# Patient Record
Sex: FEMALE | Race: WHITE
[De-identification: names, ages, dates, MRNs, and addresses within clinical notes are randomized per-mention and may not be internally consistent; named-entity substitution may affect disease eponyms.]

---

## 2018-06-06 ENCOUNTER — HOSPITAL ENCOUNTER (EMERGENCY)
Dept: HOSPITAL 94 - ER | Age: 53
Discharge: HOME | End: 2018-06-06
Payer: COMMERCIAL

## 2018-06-06 VITALS — HEIGHT: 66 IN | BODY MASS INDEX: 25.6 KG/M2 | WEIGHT: 159.28 LBS

## 2018-06-06 VITALS — SYSTOLIC BLOOD PRESSURE: 132 MMHG | DIASTOLIC BLOOD PRESSURE: 94 MMHG

## 2018-06-06 DIAGNOSIS — Z88.5: ICD-10-CM

## 2018-06-06 DIAGNOSIS — R21: Primary | ICD-10-CM

## 2018-06-06 PROCEDURE — 99281 EMR DPT VST MAYX REQ PHY/QHP: CPT

## 2020-08-16 ENCOUNTER — HOSPITAL ENCOUNTER (EMERGENCY)
Dept: HOSPITAL 94 - ER | Age: 55
LOS: 1 days | Discharge: HOME | End: 2020-08-17
Payer: MEDICAID

## 2020-08-16 VITALS — BODY MASS INDEX: 24.16 KG/M2 | WEIGHT: 150.31 LBS | HEIGHT: 66 IN

## 2020-08-16 DIAGNOSIS — E86.0: ICD-10-CM
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
## 2020-08-16 DIAGNOSIS — R11.10: ICD-10-CM

## 2020-08-16 DIAGNOSIS — K29.20: ICD-10-CM

## 2020-08-16 DIAGNOSIS — Z88.5: ICD-10-CM

## 2020-08-16 DIAGNOSIS — Z79.899: ICD-10-CM

## 2020-08-16 DIAGNOSIS — F10.129: Primary | ICD-10-CM

## 2020-08-16 DIAGNOSIS — F10.239: ICD-10-CM

## 2020-08-16 DIAGNOSIS — H61.23: ICD-10-CM

## 2020-08-16 PROCEDURE — 96376 TX/PRO/DX INJ SAME DRUG ADON: CPT

## 2020-08-16 PROCEDURE — 93005 ELECTROCARDIOGRAM TRACING: CPT

## 2020-08-16 PROCEDURE — 83880 ASSAY OF NATRIURETIC PEPTIDE: CPT

## 2020-08-16 PROCEDURE — 80053 COMPREHEN METABOLIC PANEL: CPT

## 2020-08-16 PROCEDURE — 80320 DRUG SCREEN QUANTALCOHOLS: CPT

## 2020-08-16 PROCEDURE — 96375 TX/PRO/DX INJ NEW DRUG ADDON: CPT

## 2020-08-16 PROCEDURE — 36415 COLL VENOUS BLD VENIPUNCTURE: CPT

## 2020-08-16 PROCEDURE — 96365 THER/PROPH/DIAG IV INF INIT: CPT

## 2020-08-16 PROCEDURE — 96366 THER/PROPH/DIAG IV INF ADDON: CPT

## 2020-08-16 PROCEDURE — 84484 ASSAY OF TROPONIN QUANT: CPT

## 2020-08-16 PROCEDURE — 99284 EMERGENCY DEPT VISIT MOD MDM: CPT

## 2020-08-16 PROCEDURE — 83735 ASSAY OF MAGNESIUM: CPT

## 2020-08-16 PROCEDURE — 85025 COMPLETE CBC W/AUTO DIFF WBC: CPT

## 2020-08-17 VITALS — SYSTOLIC BLOOD PRESSURE: 140 MMHG | DIASTOLIC BLOOD PRESSURE: 85 MMHG

## 2020-08-17 LAB
ALBUMIN SERPL BCP-MCNC: 3.6 G/DL (ref 3.4–5)
ALBUMIN/GLOB SERPL: 1.1 {RATIO} (ref 1.1–1.5)
ALP SERPL-CCNC: 84 IU/L (ref 46–116)
ALT SERPL W P-5'-P-CCNC: 41 U/L (ref 12–78)
ANION GAP SERPL CALCULATED.3IONS-SCNC: 11 MMOL/L (ref 8–16)
AST SERPL W P-5'-P-CCNC: 50 U/L (ref 10–37)
BASOPHILS # BLD AUTO: 0.2 X10'3 (ref 0–0.2)
BASOPHILS NFR BLD AUTO: 2.7 % (ref 0–1)
BILIRUB SERPL-MCNC: 1.5 MG/DL (ref 0.1–1)
BUN SERPL-MCNC: 21 MG/DL (ref 7–18)
BUN/CREAT SERPL: 18.3 (ref 6.6–38)
CALCIUM SERPL-MCNC: 8.2 MG/DL (ref 8.5–10.1)
CHLORIDE SERPL-SCNC: 104 MMOL/L (ref 99–107)
CO2 SERPL-SCNC: 25.5 MMOL/L (ref 24–32)
CREAT SERPL-MCNC: 1.15 MG/DL (ref 0.4–0.9)
EOSINOPHIL # BLD AUTO: 0 X10'3 (ref 0–0.9)
EOSINOPHIL NFR BLD AUTO: 0.7 % (ref 0–6)
ERYTHROCYTE [DISTWIDTH] IN BLOOD BY AUTOMATED COUNT: 12.9 % (ref 11.5–14.5)
ETHANOL SERPL-MCNC: 0.34 GM/DL (ref 0–0.01)
GFR SERPL CREATININE-BSD FRML MDRD: 49 ML/MIN
GLUCOSE SERPL-MCNC: 122 MG/DL (ref 70–104)
HCT VFR BLD AUTO: 46.9 % (ref 35–45)
HGB BLD-MCNC: 16.2 G/DL (ref 12–16)
LYMPHOCYTES # BLD AUTO: 2.3 X10'3 (ref 1.1–4.8)
LYMPHOCYTES NFR BLD AUTO: 37.3 % (ref 21–51)
MAGNESIUM SERPL-MCNC: 1.9 MG/DL (ref 1.5–2.4)
MCH RBC QN AUTO: 32.3 PG (ref 27–31)
MCHC RBC AUTO-ENTMCNC: 34.6 G/DL (ref 33–36.5)
MCV RBC AUTO: 93.4 FL (ref 78–98)
MONOCYTES # BLD AUTO: 0.4 X10'3 (ref 0–0.9)
MONOCYTES NFR BLD AUTO: 6.6 % (ref 2–12)
NEUTROPHILS # BLD AUTO: 3.3 X10'3 (ref 1.8–7.7)
NEUTROPHILS NFR BLD AUTO: 52.7 % (ref 42–75)
PLATELET # BLD AUTO: 286 X10'3 (ref 140–440)
PMV BLD AUTO: 8.4 FL (ref 7.4–10.4)
POTASSIUM SERPL-SCNC: 3.4 MMOL/L (ref 3.5–5.1)
PROT SERPL-MCNC: 7 G/DL (ref 6.4–8.2)
RBC # BLD AUTO: 5.02 X10'6 (ref 4.2–5.6)
SODIUM SERPL-SCNC: 140 MMOL/L (ref 135–145)
WBC # BLD AUTO: 6.3 X10'3 (ref 4.5–11)

## 2020-08-17 NOTE — NUR
GAVE PT SOME CRACKERS , DAYSI ASENCIO. DR GUERA GARCIA AS WELL AS PRIMARY RN . 
PT ALSO GIVEN THREE WARM BLANKETS PLAN OF CARE UPDATED

## 2021-01-07 ENCOUNTER — HOSPITAL ENCOUNTER (EMERGENCY)
Dept: HOSPITAL 94 - ER | Age: 56
LOS: 1 days | Discharge: HOME | End: 2021-01-08
Payer: MEDICAID

## 2021-01-07 VITALS — WEIGHT: 160.34 LBS | HEIGHT: 66 IN | BODY MASS INDEX: 25.77 KG/M2

## 2021-01-07 DIAGNOSIS — Z79.899: ICD-10-CM

## 2021-01-07 DIAGNOSIS — Z60.2: ICD-10-CM

## 2021-01-07 DIAGNOSIS — Z79.2: ICD-10-CM

## 2021-01-07 DIAGNOSIS — E87.6: ICD-10-CM

## 2021-01-07 DIAGNOSIS — Z72.89: ICD-10-CM

## 2021-01-07 DIAGNOSIS — N39.0: Primary | ICD-10-CM

## 2021-01-07 DIAGNOSIS — E87.2: ICD-10-CM

## 2021-01-07 DIAGNOSIS — F10.129: ICD-10-CM

## 2021-01-07 DIAGNOSIS — Z88.8: ICD-10-CM
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
## 2021-01-07 LAB
ALBUMIN SERPL BCP-MCNC: 4 G/DL (ref 3.4–5)
ALBUMIN/GLOB SERPL: 1.1 {RATIO} (ref 1.1–1.5)
ALP SERPL-CCNC: 77 IU/L (ref 46–116)
ALT SERPL W P-5'-P-CCNC: 117 U/L (ref 12–78)
ANION GAP SERPL CALCULATED.3IONS-SCNC: 19 MMOL/L (ref 8–16)
AST SERPL W P-5'-P-CCNC: 201 U/L (ref 10–37)
BACTERIA URNS QL MICRO: (no result) /HPF
BASOPHILS # BLD AUTO: 0.1 X10'3 (ref 0–0.2)
BASOPHILS NFR BLD AUTO: 1.1 % (ref 0–1)
BILIRUB SERPL-MCNC: 0.5 MG/DL (ref 0.1–1)
BUN SERPL-MCNC: 19 MG/DL (ref 7–18)
BUN/CREAT SERPL: 20.2 (ref 6.6–38)
CALCIUM SERPL-MCNC: 8.4 MG/DL (ref 8.5–10.1)
CHLORIDE SERPL-SCNC: 102 MMOL/L (ref 99–107)
CLARITY UR: (no result)
CO2 SERPL-SCNC: 23.1 MMOL/L (ref 24–32)
COLOR UR: YELLOW
CREAT SERPL-MCNC: 0.94 MG/DL (ref 0.4–0.9)
DEPRECATED SQUAMOUS URNS QL MICRO: (no result) /LPF
EOSINOPHIL # BLD AUTO: 0 X10'3 (ref 0–0.9)
EOSINOPHIL NFR BLD AUTO: 0 % (ref 0–6)
ERYTHROCYTE [DISTWIDTH] IN BLOOD BY AUTOMATED COUNT: 14.3 % (ref 11.5–14.5)
ETHANOL SERPL-MCNC: 0.4 GM/DL (ref 0–0.01)
GFR SERPL CREATININE-BSD FRML MDRD: 62 ML/MIN
GLUCOSE SERPL-MCNC: 100 MG/DL (ref 70–104)
GLUCOSE UR STRIP-MCNC: NEGATIVE MG/DL
HCT VFR BLD AUTO: 44.2 % (ref 35–45)
HGB BLD-MCNC: 15.1 G/DL (ref 12–16)
HGB UR QL STRIP: (no result)
KETONES UR STRIP-MCNC: NEGATIVE MG/DL
LEUKOCYTE ESTERASE UR QL STRIP: (no result)
LYMPHOCYTES # BLD AUTO: 1.8 X10'3 (ref 1.1–4.8)
LYMPHOCYTES NFR BLD AUTO: 16.5 % (ref 21–51)
MCH RBC QN AUTO: 32.7 PG (ref 27–31)
MCHC RBC AUTO-ENTMCNC: 34.2 G/DL (ref 33–36.5)
MCV RBC AUTO: 95.4 FL (ref 78–98)
MONOCYTES # BLD AUTO: 0.3 X10'3 (ref 0–0.9)
MONOCYTES NFR BLD AUTO: 2.9 % (ref 2–12)
NEUTROPHILS # BLD AUTO: 8.5 X10'3 (ref 1.8–7.7)
NEUTROPHILS NFR BLD AUTO: 79.5 % (ref 42–75)
NITRITE UR QL STRIP: POSITIVE
PH UR STRIP: 6 [PH] (ref 4.8–8)
PLATELET # BLD AUTO: 253 X10'3 (ref 140–440)
PMV BLD AUTO: 8 FL (ref 7.4–10.4)
POTASSIUM SERPL-SCNC: 3.4 MMOL/L (ref 3.5–5.1)
PROT SERPL-MCNC: 7.5 G/DL (ref 6.4–8.2)
PROT UR QL STRIP: NEGATIVE MG/DL
RBC # BLD AUTO: 4.63 X10'6 (ref 4.2–5.6)
RBC #/AREA URNS HPF: (no result) /HPF (ref 0–2)
SODIUM SERPL-SCNC: 144 MMOL/L (ref 135–145)
SP GR UR STRIP: <=1.005 (ref 1–1.03)
URN COLLECT METHOD CLASS: (no result)
UROBILINOGEN UR STRIP-MCNC: 0.2 E.U/DL (ref 0.2–1)
WBC # BLD AUTO: 10.7 X10'3 (ref 4.5–11)

## 2021-01-07 PROCEDURE — 96361 HYDRATE IV INFUSION ADD-ON: CPT

## 2021-01-07 PROCEDURE — 96365 THER/PROPH/DIAG IV INF INIT: CPT

## 2021-01-07 PROCEDURE — 87088 URINE BACTERIA CULTURE: CPT

## 2021-01-07 PROCEDURE — 83605 ASSAY OF LACTIC ACID: CPT

## 2021-01-07 PROCEDURE — 96376 TX/PRO/DX INJ SAME DRUG ADON: CPT

## 2021-01-07 PROCEDURE — 87186 SC STD MICRODIL/AGAR DIL: CPT

## 2021-01-07 PROCEDURE — 87077 CULTURE AEROBIC IDENTIFY: CPT

## 2021-01-07 PROCEDURE — 93005 ELECTROCARDIOGRAM TRACING: CPT

## 2021-01-07 PROCEDURE — 96366 THER/PROPH/DIAG IV INF ADDON: CPT

## 2021-01-07 PROCEDURE — 36415 COLL VENOUS BLD VENIPUNCTURE: CPT

## 2021-01-07 PROCEDURE — 85025 COMPLETE CBC W/AUTO DIFF WBC: CPT

## 2021-01-07 PROCEDURE — 80053 COMPREHEN METABOLIC PANEL: CPT

## 2021-01-07 PROCEDURE — 96375 TX/PRO/DX INJ NEW DRUG ADDON: CPT

## 2021-01-07 PROCEDURE — 96367 TX/PROPH/DG ADDL SEQ IV INF: CPT

## 2021-01-07 PROCEDURE — 80320 DRUG SCREEN QUANTALCOHOLS: CPT

## 2021-01-07 PROCEDURE — 81001 URINALYSIS AUTO W/SCOPE: CPT

## 2021-01-07 PROCEDURE — 99285 EMERGENCY DEPT VISIT HI MDM: CPT

## 2021-01-07 SDOH — SOCIAL STABILITY - SOCIAL INSECURITY: PROBLEMS RELATED TO LIVING ALONE: Z60.2

## 2021-01-08 VITALS — SYSTOLIC BLOOD PRESSURE: 121 MMHG | DIASTOLIC BLOOD PRESSURE: 94 MMHG

## 2021-01-08 LAB
ALBUMIN SERPL BCP-MCNC: 3 G/DL (ref 3.4–5)
ALBUMIN/GLOB SERPL: 1.1 {RATIO} (ref 1.1–1.5)
ALP SERPL-CCNC: 61 IU/L (ref 46–116)
ALT SERPL W P-5'-P-CCNC: 86 U/L (ref 12–78)
ANION GAP SERPL CALCULATED.3IONS-SCNC: 14 MMOL/L (ref 8–16)
AST SERPL W P-5'-P-CCNC: 126 U/L (ref 10–37)
BILIRUB SERPL-MCNC: 0.5 MG/DL (ref 0.1–1)
BUN SERPL-MCNC: 18 MG/DL (ref 7–18)
BUN/CREAT SERPL: 20 (ref 6.6–38)
CALCIUM SERPL-MCNC: 6.9 MG/DL (ref 8.5–10.1)
CHLORIDE SERPL-SCNC: 106 MMOL/L (ref 99–107)
CO2 SERPL-SCNC: 22.1 MMOL/L (ref 24–32)
CREAT SERPL-MCNC: 0.9 MG/DL (ref 0.4–0.9)
GFR SERPL CREATININE-BSD FRML MDRD: 65 ML/MIN
GLUCOSE SERPL-MCNC: 149 MG/DL (ref 70–104)
POTASSIUM SERPL-SCNC: 3.6 MMOL/L (ref 3.5–5.1)
PROT SERPL-MCNC: 5.8 G/DL (ref 6.4–8.2)
SODIUM SERPL-SCNC: 142 MMOL/L (ref 135–145)

## 2021-07-24 ENCOUNTER — HOSPITAL ENCOUNTER (EMERGENCY)
Dept: HOSPITAL 94 - ER | Age: 56
Discharge: HOME | End: 2021-07-24
Payer: MEDICAID

## 2021-07-24 VITALS — BODY MASS INDEX: 25.28 KG/M2 | HEIGHT: 66 IN | WEIGHT: 157.32 LBS

## 2021-07-24 VITALS — SYSTOLIC BLOOD PRESSURE: 157 MMHG | DIASTOLIC BLOOD PRESSURE: 104 MMHG

## 2021-07-24 DIAGNOSIS — S00.31XA: ICD-10-CM

## 2021-07-24 DIAGNOSIS — F32.9: ICD-10-CM
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
## 2021-07-24 DIAGNOSIS — G89.29: ICD-10-CM

## 2021-07-24 DIAGNOSIS — Z79.899: ICD-10-CM

## 2021-07-24 DIAGNOSIS — Z02.89: Primary | ICD-10-CM
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
## 2021-07-24 DIAGNOSIS — F10.20: ICD-10-CM

## 2021-07-24 DIAGNOSIS — Z72.89: ICD-10-CM

## 2021-07-24 DIAGNOSIS — Z88.8: ICD-10-CM

## 2021-07-24 DIAGNOSIS — Z60.2: ICD-10-CM
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
## 2021-07-24 PROCEDURE — 99284 EMERGENCY DEPT VISIT MOD MDM: CPT

## 2021-07-24 PROCEDURE — 70450 CT HEAD/BRAIN W/O DYE: CPT

## 2021-07-24 SDOH — SOCIAL STABILITY - SOCIAL INSECURITY: PROBLEMS RELATED TO LIVING ALONE: Z60.2

## 2021-09-12 ENCOUNTER — HOSPITAL ENCOUNTER (INPATIENT)
Dept: HOSPITAL 94 - ER | Age: 56
LOS: 2 days | Discharge: HOME | DRG: 45 | End: 2021-09-14
Attending: INTERNAL MEDICINE | Admitting: INTERNAL MEDICINE
Payer: MEDICAID

## 2021-09-12 VITALS — HEIGHT: 66 IN | WEIGHT: 141.1 LBS | BODY MASS INDEX: 22.68 KG/M2

## 2021-09-12 DIAGNOSIS — R26.81: ICD-10-CM

## 2021-09-12 DIAGNOSIS — N18.30: ICD-10-CM

## 2021-09-12 DIAGNOSIS — Z60.2: ICD-10-CM

## 2021-09-12 DIAGNOSIS — R74.8: ICD-10-CM

## 2021-09-12 DIAGNOSIS — F10.229: ICD-10-CM

## 2021-09-12 DIAGNOSIS — E87.1: ICD-10-CM

## 2021-09-12 DIAGNOSIS — Z88.5: ICD-10-CM

## 2021-09-12 DIAGNOSIS — Z79.890: ICD-10-CM

## 2021-09-12 DIAGNOSIS — K72.90: ICD-10-CM

## 2021-09-12 DIAGNOSIS — Z79.82: ICD-10-CM

## 2021-09-12 DIAGNOSIS — R29.702: ICD-10-CM

## 2021-09-12 DIAGNOSIS — R21: ICD-10-CM

## 2021-09-12 DIAGNOSIS — E87.6: ICD-10-CM

## 2021-09-12 DIAGNOSIS — Z79.899: ICD-10-CM

## 2021-09-12 DIAGNOSIS — B96.89: ICD-10-CM

## 2021-09-12 DIAGNOSIS — I63.9: Primary | ICD-10-CM

## 2021-09-12 DIAGNOSIS — R47.1: ICD-10-CM

## 2021-09-12 DIAGNOSIS — D69.59: ICD-10-CM

## 2021-09-12 DIAGNOSIS — F32.9: ICD-10-CM

## 2021-09-12 DIAGNOSIS — G89.29: ICD-10-CM

## 2021-09-12 DIAGNOSIS — Z71.41: ICD-10-CM

## 2021-09-12 DIAGNOSIS — R26.89: ICD-10-CM

## 2021-09-12 DIAGNOSIS — E03.9: ICD-10-CM

## 2021-09-12 DIAGNOSIS — Z79.02: ICD-10-CM

## 2021-09-12 DIAGNOSIS — N39.0: ICD-10-CM

## 2021-09-12 DIAGNOSIS — I12.9: ICD-10-CM

## 2021-09-12 LAB
ALBUMIN SERPL BCP-MCNC: 3 G/DL (ref 3.4–5)
ALBUMIN SERPL BCP-MCNC: 3.5 G/DL (ref 3.4–5)
ALBUMIN/GLOB SERPL: 0.9 {RATIO} (ref 1.1–1.5)
ALP SERPL-CCNC: 127 IU/L (ref 46–116)
ALT SERPL W P-5'-P-CCNC: 108 U/L (ref 12–78)
AMPHETAMINES UR QL SCN: NEGATIVE
ANION GAP SERPL CALCULATED.3IONS-SCNC: 13 MMOL/L (ref 8–16)
ANION GAP SERPL CALCULATED.3IONS-SCNC: 21 MMOL/L (ref 8–16)
ANISOCYTOSIS BLD QL SMEAR: (no result)
AST SERPL W P-5'-P-CCNC: 232 U/L (ref 10–37)
BACTERIA URNS QL MICRO: (no result) /HPF
BARBITURATES UR QL SCN: NEGATIVE
BASOPHILS # BLD AUTO: 0 X10'3 (ref 0–0.2)
BASOPHILS NFR BLD AUTO: 0.2 % (ref 0–1)
BENZODIAZ UR QL SCN: NEGATIVE
BILIRUB SERPL-MCNC: 1.6 MG/DL (ref 0.1–1)
BUN SERPL-MCNC: 30 MG/DL (ref 7–18)
BUN SERPL-MCNC: 35 MG/DL (ref 7–18)
BUN/CREAT SERPL: 20.6 (ref 6.6–38)
BUN/CREAT SERPL: 23.4 (ref 6.6–38)
BZE UR QL SCN: NEGATIVE
CALCIUM SERPL-MCNC: 7 MG/DL (ref 8.5–10.1)
CALCIUM SERPL-MCNC: 7.6 MG/DL (ref 8.5–10.1)
CANNABINOIDS UR QL SCN: NEGATIVE
CHLORIDE SERPL-SCNC: 80 MMOL/L (ref 99–107)
CHLORIDE SERPL-SCNC: 92 MMOL/L (ref 99–107)
CLARITY UR: (no result)
CO2 SERPL-SCNC: 21.2 MMOL/L (ref 24–32)
CO2 SERPL-SCNC: 25.7 MMOL/L (ref 24–32)
COLOR UR: YELLOW
CREAT SERPL-MCNC: 1.28 MG/DL (ref 0.4–0.9)
CREAT SERPL-MCNC: 1.7 MG/DL (ref 0.4–0.9)
DEPRECATED SQUAMOUS URNS QL MICRO: (no result) /LPF
EOSINOPHIL # BLD AUTO: 0 X10'3 (ref 0–0.9)
EOSINOPHIL NFR BLD AUTO: 0.3 % (ref 0–6)
ERYTHROCYTE [DISTWIDTH] IN BLOOD BY AUTOMATED COUNT: 15.8 % (ref 11.5–14.5)
ETHANOL SERPL-MCNC: < 0.01 GM/DL (ref 0–0.01)
GFR SERPL CREATININE-BSD FRML MDRD: 31 ML/MIN
GFR SERPL CREATININE-BSD FRML MDRD: 43 ML/MIN
GLUCOSE SERPL-MCNC: 134 MG/DL (ref 70–104)
GLUCOSE SERPL-MCNC: 197 MG/DL (ref 70–104)
GLUCOSE UR STRIP-MCNC: NEGATIVE MG/DL
HBA1C MFR BLD: 6 % (ref 4.5–6.2)
HCT VFR BLD AUTO: 42.9 % (ref 35–45)
HGB BLD-MCNC: 14.3 G/DL (ref 12–16)
HGB UR QL STRIP: (no result)
KETONES UR STRIP-MCNC: 15 MG/DL
LEUKOCYTE ESTERASE UR QL STRIP: (no result)
LYMPHOCYTES # BLD AUTO: 0.3 X10'3 (ref 1.1–4.8)
LYMPHOCYTES NFR BLD AUTO: 4.6 % (ref 21–51)
LYMPHOCYTES NFR BLD MANUAL: 3 % (ref 21–51)
MACROCYTES BLD QL SMEAR: (no result)
MCH RBC QN AUTO: 32.5 PG (ref 27–31)
MCHC RBC AUTO-ENTMCNC: 33.3 G/DL (ref 33–36.5)
MCV RBC AUTO: 97.5 FL (ref 78–98)
METHADONE UR QL SCN: NEGATIVE
MONOCYTES # BLD AUTO: 0.3 X10'3 (ref 0–0.9)
MONOCYTES NFR BLD AUTO: 4 % (ref 2–12)
MONOCYTES NFR BLD MANUAL: 3 % (ref 2–12)
NEUTROPHILS # BLD AUTO: 6 X10'3 (ref 1.8–7.7)
NEUTROPHILS NFR BLD AUTO: 90.9 % (ref 42–75)
NEUTS BAND # BLD MANUAL: 32 % (ref 0–10)
NEUTS SEG NFR BLD MANUAL: 62 % (ref 42–75)
NEUTS VAC BLD QL SMEAR: (no result)
NITRITE UR QL STRIP: NEGATIVE
NRBC BLD MANUAL-RTO: 1 /100WBC (ref 0–0)
OPIATES UR QL SCN: NEGATIVE
PCP UR QL SCN: NEGATIVE
PH UR STRIP: 6.5 [PH] (ref 4.8–8)
PLATELET # BLD AUTO: 101 X10'3 (ref 140–440)
PLATELET BLD QL SMEAR: (no result)
PMV BLD AUTO: 9.2 FL (ref 7.4–10.4)
POLYCHROMASIA BLD QL SMEAR: (no result)
POTASSIUM SERPL-SCNC: 3.7 MMOL/L (ref 3.5–5.1)
POTASSIUM SERPL-SCNC: 3.9 MMOL/L (ref 3.5–5.1)
PROT SERPL-MCNC: 7.2 G/DL (ref 6.4–8.2)
PROT UR QL STRIP: NEGATIVE MG/DL
RBC # BLD AUTO: 4.4 X10'6 (ref 4.2–5.6)
RBC #/AREA URNS HPF: (no result) /HPF (ref 0–2)
RBC MORPH BLD: (no result)
SODIUM SERPL-SCNC: 122 MMOL/L (ref 135–145)
SODIUM SERPL-SCNC: 131 MMOL/L (ref 135–145)
SP GR UR STRIP: 1 (ref 1–1.03)
TOTAL CELLS COUNTED FLD: 100
URN COLLECT METHOD CLASS: (no result)
UROBILINOGEN UR STRIP-MCNC: 0.2 E.U/DL (ref 0.2–1)
WBC # BLD AUTO: 6.6 X10'3 (ref 4.5–11)
WBC #/AREA URNS HPF: (no result) /HPF (ref 0–4)
WBC CLUMPS #/AREA URNS HPF: (no result) /HPF

## 2021-09-12 PROCEDURE — 70450 CT HEAD/BRAIN W/O DYE: CPT

## 2021-09-12 PROCEDURE — 87186 SC STD MICRODIL/AGAR DIL: CPT

## 2021-09-12 PROCEDURE — 70551 MRI BRAIN STEM W/O DYE: CPT

## 2021-09-12 PROCEDURE — 93880 EXTRACRANIAL BILAT STUDY: CPT

## 2021-09-12 PROCEDURE — 80305 DRUG TEST PRSMV DIR OPT OBS: CPT

## 2021-09-12 PROCEDURE — 83036 HEMOGLOBIN GLYCOSYLATED A1C: CPT

## 2021-09-12 PROCEDURE — 97530 THERAPEUTIC ACTIVITIES: CPT

## 2021-09-12 PROCEDURE — 80320 DRUG SCREEN QUANTALCOHOLS: CPT

## 2021-09-12 PROCEDURE — 99285 EMERGENCY DEPT VISIT HI MDM: CPT

## 2021-09-12 PROCEDURE — 80061 LIPID PANEL: CPT

## 2021-09-12 PROCEDURE — 83735 ASSAY OF MAGNESIUM: CPT

## 2021-09-12 PROCEDURE — 82140 ASSAY OF AMMONIA: CPT

## 2021-09-12 PROCEDURE — 85007 BL SMEAR W/DIFF WBC COUNT: CPT

## 2021-09-12 PROCEDURE — 80048 BASIC METABOLIC PNL TOTAL CA: CPT

## 2021-09-12 PROCEDURE — 93005 ELECTROCARDIOGRAM TRACING: CPT

## 2021-09-12 PROCEDURE — 92616 FEES W/LARYNGEAL SENSE TEST: CPT

## 2021-09-12 PROCEDURE — 97161 PT EVAL LOW COMPLEX 20 MIN: CPT

## 2021-09-12 PROCEDURE — 87088 URINE BACTERIA CULTURE: CPT

## 2021-09-12 PROCEDURE — 36415 COLL VENOUS BLD VENIPUNCTURE: CPT

## 2021-09-12 PROCEDURE — 87081 CULTURE SCREEN ONLY: CPT

## 2021-09-12 PROCEDURE — 92508 TX SP LANG VOICE COMM GROUP: CPT

## 2021-09-12 PROCEDURE — 87077 CULTURE AEROBIC IDENTIFY: CPT

## 2021-09-12 PROCEDURE — 83690 ASSAY OF LIPASE: CPT

## 2021-09-12 PROCEDURE — 93306 TTE W/DOPPLER COMPLETE: CPT

## 2021-09-12 PROCEDURE — 84100 ASSAY OF PHOSPHORUS: CPT

## 2021-09-12 PROCEDURE — 85025 COMPLETE CBC W/AUTO DIFF WBC: CPT

## 2021-09-12 PROCEDURE — 80053 COMPREHEN METABOLIC PANEL: CPT

## 2021-09-12 PROCEDURE — 71045 X-RAY EXAM CHEST 1 VIEW: CPT

## 2021-09-12 PROCEDURE — 82948 REAGENT STRIP/BLOOD GLUCOSE: CPT

## 2021-09-12 PROCEDURE — 70544 MR ANGIOGRAPHY HEAD W/O DYE: CPT

## 2021-09-12 PROCEDURE — 97116 GAIT TRAINING THERAPY: CPT

## 2021-09-12 PROCEDURE — 81001 URINALYSIS AUTO W/SCOPE: CPT

## 2021-09-12 SDOH — SOCIAL STABILITY - SOCIAL INSECURITY: PROBLEMS RELATED TO LIVING ALONE: Z60.2

## 2021-09-13 VITALS — DIASTOLIC BLOOD PRESSURE: 80 MMHG | SYSTOLIC BLOOD PRESSURE: 123 MMHG

## 2021-09-13 VITALS — DIASTOLIC BLOOD PRESSURE: 78 MMHG | SYSTOLIC BLOOD PRESSURE: 126 MMHG

## 2021-09-13 LAB
ALBUMIN SERPL BCP-MCNC: 3 G/DL (ref 3.4–5)
ALBUMIN/GLOB SERPL: 0.9 {RATIO} (ref 1.1–1.5)
ALP SERPL-CCNC: 108 IU/L (ref 46–116)
ALT SERPL W P-5'-P-CCNC: 94 U/L (ref 12–78)
ANION GAP SERPL CALCULATED.3IONS-SCNC: 16 MMOL/L (ref 8–16)
AST SERPL W P-5'-P-CCNC: 186 U/L (ref 10–37)
BASOPHILS # BLD AUTO: 0 X10'3 (ref 0–0.2)
BASOPHILS NFR BLD AUTO: 0.1 % (ref 0–1)
BILIRUB SERPL-MCNC: 1.1 MG/DL (ref 0.1–1)
BUN SERPL-MCNC: 23 MG/DL (ref 7–18)
BUN/CREAT SERPL: 18.4 (ref 6.6–38)
CALCIUM SERPL-MCNC: 7.3 MG/DL (ref 8.5–10.1)
CHLORIDE SERPL-SCNC: 94 MMOL/L (ref 99–107)
CHOLEST SERPL-MCNC: 115 MG/DL (ref 0–200)
CHOLEST/HDLC SERPL: 3.7 {RATIO} (ref 0–4.99)
CO2 SERPL-SCNC: 24.5 MMOL/L (ref 24–32)
CREAT SERPL-MCNC: 1.25 MG/DL (ref 0.4–0.9)
EOSINOPHIL # BLD AUTO: 0 X10'3 (ref 0–0.9)
EOSINOPHIL NFR BLD AUTO: 0.1 % (ref 0–6)
ERYTHROCYTE [DISTWIDTH] IN BLOOD BY AUTOMATED COUNT: 14.8 % (ref 11.5–14.5)
GFR SERPL CREATININE-BSD FRML MDRD: 44 ML/MIN
GLUCOSE SERPL-MCNC: 132 MG/DL (ref 70–104)
HCT VFR BLD AUTO: 38.2 % (ref 35–45)
HDLC SERPL-MCNC: 31 MG/DL (ref 35–60)
HGB BLD-MCNC: 13 G/DL (ref 12–16)
LDLC SERPL DIRECT ASSAY-MCNC: 39 MG/DL (ref 50–100)
LIPASE SERPL-CCNC: 857 U/L (ref 73–393)
LYMPHOCYTES # BLD AUTO: 0.3 X10'3 (ref 1.1–4.8)
LYMPHOCYTES NFR BLD AUTO: 5 % (ref 21–51)
MAGNESIUM SERPL-MCNC: 1.9 MG/DL (ref 1.5–2.4)
MCH RBC QN AUTO: 31.9 PG (ref 27–31)
MCHC RBC AUTO-ENTMCNC: 34 G/DL (ref 33–36.5)
MCV RBC AUTO: 93.9 FL (ref 78–98)
MONOCYTES # BLD AUTO: 0.4 X10'3 (ref 0–0.9)
MONOCYTES NFR BLD AUTO: 6.7 % (ref 2–12)
NEUTROPHILS # BLD AUTO: 5.6 X10'3 (ref 1.8–7.7)
NEUTROPHILS NFR BLD AUTO: 88.1 % (ref 42–75)
PHOSPHATE SERPL-MCNC: 1.8 MG/DL (ref 2.3–4.5)
PLATELET # BLD AUTO: 83 X10'3 (ref 140–440)
PMV BLD AUTO: 9.1 FL (ref 7.4–10.4)
POTASSIUM SERPL-SCNC: 3.4 MMOL/L (ref 3.5–5.1)
PROT SERPL-MCNC: 6.3 G/DL (ref 6.4–8.2)
RBC # BLD AUTO: 4.07 X10'6 (ref 4.2–5.6)
SODIUM SERPL-SCNC: 134 MMOL/L (ref 135–145)
TRIGL SERPL-MCNC: 230 MG/DL (ref 20–135)
WBC # BLD AUTO: 6.3 X10'3 (ref 4.5–11)

## 2021-09-13 RX ADMIN — NYSTATIN AND TRIAMCINOLONE ACETONIDE SCH APPLIC: 100000; 1 CREAM TOPICAL at 22:20

## 2021-09-13 RX ADMIN — Medication SCH TAB: at 16:26

## 2021-09-13 RX ADMIN — SODIUM CHLORIDE SCH MLS/HR: 9 INJECTION INTRAMUSCULAR; INTRAVENOUS; SUBCUTANEOUS at 00:06

## 2021-09-13 RX ADMIN — CEFTRIAXONE SCH MLS/HR: 2 INJECTION, SOLUTION INTRAVENOUS at 00:06

## 2021-09-13 RX ADMIN — CEFTRIAXONE SCH MLS/HR: 2 INJECTION, SOLUTION INTRAVENOUS at 10:12

## 2021-09-13 RX ADMIN — LEVOTHYROXINE SODIUM SCH MCG: 75 TABLET ORAL at 10:12

## 2021-09-13 RX ADMIN — NYSTATIN AND TRIAMCINOLONE ACETONIDE SCH APPLIC: 100000; 1 CREAM TOPICAL at 10:13

## 2021-09-13 RX ADMIN — BUPROPION HYDROCHLORIDE SCH MG: 150 TABLET, FILM COATED, EXTENDED RELEASE ORAL at 20:00

## 2021-09-13 RX ADMIN — BUPROPION HYDROCHLORIDE SCH MG: 150 TABLET, FILM COATED, EXTENDED RELEASE ORAL at 10:12

## 2021-09-13 RX ADMIN — POTASSIUM CHLORIDE PRN MEQ: 1500 TABLET, EXTENDED RELEASE ORAL at 22:27

## 2021-09-13 RX ADMIN — SODIUM CHLORIDE SCH MLS/HR: 9 INJECTION INTRAMUSCULAR; INTRAVENOUS; SUBCUTANEOUS at 22:21

## 2021-09-13 RX ADMIN — SODIUM CHLORIDE SCH MLS/HR: 9 INJECTION INTRAMUSCULAR; INTRAVENOUS; SUBCUTANEOUS at 10:13

## 2021-09-13 NOTE — NUR
PATIENT IS FREQUENTLY UP WALKING AROUND ROOM AND TAKING OFF PULSE OX AND BP 
CUFF. MONITORING EQUIPMENT REAPPLIED AND PATIENT IS ASKED SEVERAL TIMES 
THROUGHOUT THE SHIFT TO KEEP THESE ON FOR MONITORING.

## 2021-09-13 NOTE — NUR
Dr Behl notified of MRI results,orders received for plavix, asprin and SCD's. 
SCD's placed on patient at this time.

## 2021-09-13 NOTE — NUR
Patient in room PCU 3014. I have received report from  HI Motta  and had the opportunity 
to ask questions and assume patient care.

## 2021-09-13 NOTE — NUR
SWALLOW STUDY COMPLETED PER ST. PATIENT WILL BE ON [facility] SOFT DIET WITH THIN 
LIQUIDS.

## 2021-09-14 VITALS — SYSTOLIC BLOOD PRESSURE: 126 MMHG | DIASTOLIC BLOOD PRESSURE: 75 MMHG

## 2021-09-14 LAB
ALBUMIN SERPL BCP-MCNC: 2.6 G/DL (ref 3.4–5)
ALBUMIN/GLOB SERPL: 0.9 {RATIO} (ref 1.1–1.5)
ALP SERPL-CCNC: 90 IU/L (ref 46–116)
ALT SERPL W P-5'-P-CCNC: 79 U/L (ref 12–78)
ANION GAP SERPL CALCULATED.3IONS-SCNC: 9 MMOL/L (ref 8–16)
AST SERPL W P-5'-P-CCNC: 99 U/L (ref 10–37)
BASOPHILS # BLD AUTO: 0 X10'3 (ref 0–0.2)
BASOPHILS NFR BLD AUTO: 0.1 % (ref 0–1)
BILIRUB SERPL-MCNC: 0.8 MG/DL (ref 0.1–1)
BUN SERPL-MCNC: 7 MG/DL (ref 7–18)
BUN/CREAT SERPL: 7.9 (ref 6.6–38)
CALCIUM SERPL-MCNC: 7.5 MG/DL (ref 8.5–10.1)
CHLORIDE SERPL-SCNC: 100 MMOL/L (ref 99–107)
CO2 SERPL-SCNC: 27.2 MMOL/L (ref 24–32)
CREAT SERPL-MCNC: 0.89 MG/DL (ref 0.4–0.9)
EOSINOPHIL # BLD AUTO: 0.1 X10'3 (ref 0–0.9)
EOSINOPHIL NFR BLD AUTO: 1.6 % (ref 0–6)
ERYTHROCYTE [DISTWIDTH] IN BLOOD BY AUTOMATED COUNT: 15 % (ref 11.5–14.5)
GFR SERPL CREATININE-BSD FRML MDRD: 66 ML/MIN
GLUCOSE SERPL-MCNC: 143 MG/DL (ref 70–104)
HCT VFR BLD AUTO: 35.7 % (ref 35–45)
HGB BLD-MCNC: 12.2 G/DL (ref 12–16)
LIPASE SERPL-CCNC: 7847 U/L (ref 73–393)
LYMPHOCYTES # BLD AUTO: 0.7 X10'3 (ref 1.1–4.8)
LYMPHOCYTES NFR BLD AUTO: 16.8 % (ref 21–51)
MAGNESIUM SERPL-MCNC: 2 MG/DL (ref 1.5–2.4)
MCH RBC QN AUTO: 32.4 PG (ref 27–31)
MCHC RBC AUTO-ENTMCNC: 34.1 G/DL (ref 33–36.5)
MCV RBC AUTO: 95.2 FL (ref 78–98)
MONOCYTES # BLD AUTO: 0.3 X10'3 (ref 0–0.9)
MONOCYTES NFR BLD AUTO: 7.7 % (ref 2–12)
NEUTROPHILS # BLD AUTO: 3.1 X10'3 (ref 1.8–7.7)
NEUTROPHILS NFR BLD AUTO: 73.8 % (ref 42–75)
PHOSPHATE SERPL-MCNC: 1.3 MG/DL (ref 2.3–4.5)
PLATELET # BLD AUTO: 73 X10'3 (ref 140–440)
PMV BLD AUTO: 9.8 FL (ref 7.4–10.4)
POTASSIUM SERPL-SCNC: 3 MMOL/L (ref 3.5–5.1)
PROT SERPL-MCNC: 5.6 G/DL (ref 6.4–8.2)
RBC # BLD AUTO: 3.75 X10'6 (ref 4.2–5.6)
SODIUM SERPL-SCNC: 136 MMOL/L (ref 135–145)
WBC # BLD AUTO: 4.2 X10'3 (ref 4.5–11)

## 2021-09-14 RX ADMIN — POTASSIUM CHLORIDE PRN MEQ: 1500 TABLET, EXTENDED RELEASE ORAL at 08:46

## 2021-09-14 RX ADMIN — BUPROPION HYDROCHLORIDE SCH MG: 150 TABLET, FILM COATED, EXTENDED RELEASE ORAL at 08:45

## 2021-09-14 RX ADMIN — CEFTRIAXONE SCH MLS/HR: 2 INJECTION, SOLUTION INTRAVENOUS at 12:13

## 2021-09-14 RX ADMIN — POTASSIUM CHLORIDE PRN MEQ: 1500 TABLET, EXTENDED RELEASE ORAL at 12:12

## 2021-09-14 RX ADMIN — SODIUM CHLORIDE SCH MLS/HR: 9 INJECTION INTRAMUSCULAR; INTRAVENOUS; SUBCUTANEOUS at 13:55

## 2021-09-14 RX ADMIN — NYSTATIN AND TRIAMCINOLONE ACETONIDE SCH APPLIC: 100000; 1 CREAM TOPICAL at 08:49

## 2021-09-14 RX ADMIN — SODIUM CHLORIDE SCH MLS/HR: 9 INJECTION INTRAMUSCULAR; INTRAVENOUS; SUBCUTANEOUS at 03:55

## 2021-09-14 RX ADMIN — LEVOTHYROXINE SODIUM SCH MCG: 75 TABLET ORAL at 08:48

## 2021-09-14 RX ADMIN — POTASSIUM CHLORIDE PRN MEQ: 1500 TABLET, EXTENDED RELEASE ORAL at 04:01

## 2021-09-14 RX ADMIN — Medication SCH TAB: at 08:45

## 2021-09-14 NOTE — NUR
Orientee Medication Administration:

For this medication-pass time frame, all medication were reviewed, dispensed, administered 
and documented per hospital policy by HI Mccarty. 



Orientee documentation:

I have reviewed and agree with all interventions, assessments performed and documented by 
HI Mccarty.

## 2021-09-14 NOTE — NUR
Patient in room PCU 3014. I have received report from HI Pedersen and had the opportunity 
to ask questions and assume patient care.

## 2021-09-14 NOTE — NUR
Problems reprioritized. Patient report given, questions answered & plan of care reviewed 
with HI Summers.

## 2021-09-14 NOTE — NUR
Paged Dr. Behl



PAGER ID:  [phone] 

MESSAGE:  [id number] Dleaney Vargas; potassium 3.0, replacing per protocol. Shreya DO [phone]

## 2021-09-14 NOTE — NUR
Patient is stable for discharge per MD orders. All discharge instructions per MD orders. All 
discharge instructions reviewed with patient and all questions answered. Patient will make 
own follow-up appointment. New prescriptions sent to [facility]. PIV discontinued with cannula 
intact. Telemetry monitor discontinued. Belongings sent with patient. Patient was wheeled to 
lobby and left in a private vehicle with a family member.

## 2023-01-21 ENCOUNTER — HOSPITAL ENCOUNTER (EMERGENCY)
Dept: HOSPITAL 94 - ER | Age: 58
Discharge: HOME | End: 2023-01-21
Payer: MEDICAID

## 2023-01-21 VITALS — BODY MASS INDEX: 22.55 KG/M2 | HEIGHT: 66 IN | WEIGHT: 140.3 LBS

## 2023-01-21 VITALS — DIASTOLIC BLOOD PRESSURE: 56 MMHG | SYSTOLIC BLOOD PRESSURE: 101 MMHG

## 2023-01-21 DIAGNOSIS — Z88.5: ICD-10-CM

## 2023-01-21 DIAGNOSIS — F10.10: Primary | ICD-10-CM
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
## 2023-01-21 DIAGNOSIS — G89.29: ICD-10-CM

## 2023-01-21 PROCEDURE — 99284 EMERGENCY DEPT VISIT MOD MDM: CPT
